# Patient Record
Sex: MALE | Race: WHITE | NOT HISPANIC OR LATINO | Employment: OTHER | URBAN - NONMETROPOLITAN AREA
[De-identification: names, ages, dates, MRNs, and addresses within clinical notes are randomized per-mention and may not be internally consistent; named-entity substitution may affect disease eponyms.]

---

## 2017-10-05 ENCOUNTER — APPOINTMENT (OUTPATIENT)
Dept: GENERAL RADIOLOGY | Facility: HOSPITAL | Age: 67
End: 2017-10-05

## 2017-10-05 ENCOUNTER — HOSPITAL ENCOUNTER (INPATIENT)
Facility: HOSPITAL | Age: 67
LOS: 1 days | Discharge: HOME | End: 2017-10-06
Attending: NEUROLOGICAL SURGERY | Admitting: NEUROLOGICAL SURGERY

## 2017-10-05 DIAGNOSIS — Z74.09 IMPAIRED MOBILITY AND ADLS: ICD-10-CM

## 2017-10-05 DIAGNOSIS — Z78.9 IMPAIRED MOBILITY AND ADLS: ICD-10-CM

## 2017-10-05 DIAGNOSIS — Z74.09 IMPAIRED FUNCTIONAL MOBILITY, BALANCE, AND ENDURANCE: ICD-10-CM

## 2017-10-05 PROBLEM — S06.5XAA SDH (SUBDURAL HEMATOMA) (HCC): Status: ACTIVE | Noted: 2017-10-05

## 2017-10-05 PROCEDURE — 25810000003 SODIUM CHLORIDE 0.9 % WITH KCL 20 MEQ 20-0.9 MEQ/L-% SOLUTION: Performed by: NEUROLOGICAL SURGERY

## 2017-10-05 PROCEDURE — 94799 UNLISTED PULMONARY SVC/PX: CPT

## 2017-10-05 PROCEDURE — 99223 1ST HOSP IP/OBS HIGH 75: CPT | Performed by: NEUROLOGICAL SURGERY

## 2017-10-05 RX ORDER — FAMOTIDINE 20 MG/1
20 TABLET, FILM COATED ORAL DAILY
Status: DISCONTINUED | OUTPATIENT
Start: 2017-10-06 | End: 2017-10-06 | Stop reason: HOSPADM

## 2017-10-05 RX ORDER — SODIUM CHLORIDE AND POTASSIUM CHLORIDE 150; 900 MG/100ML; MG/100ML
100 INJECTION, SOLUTION INTRAVENOUS CONTINUOUS
Status: DISCONTINUED | OUTPATIENT
Start: 2017-10-05 | End: 2017-10-06 | Stop reason: HOSPADM

## 2017-10-05 RX ORDER — SODIUM CHLORIDE 0.9 % (FLUSH) 0.9 %
1-10 SYRINGE (ML) INJECTION AS NEEDED
Status: DISCONTINUED | OUTPATIENT
Start: 2017-10-05 | End: 2017-10-06 | Stop reason: HOSPADM

## 2017-10-05 RX ORDER — ACETAMINOPHEN 650 MG/1
650 SUPPOSITORY RECTAL EVERY 4 HOURS PRN
Status: DISCONTINUED | OUTPATIENT
Start: 2017-10-05 | End: 2017-10-06 | Stop reason: HOSPADM

## 2017-10-05 RX ORDER — ACETAMINOPHEN 325 MG/1
650 TABLET ORAL EVERY 4 HOURS PRN
Status: DISCONTINUED | OUTPATIENT
Start: 2017-10-05 | End: 2017-10-06 | Stop reason: HOSPADM

## 2017-10-05 RX ORDER — ONDANSETRON 2 MG/ML
4 INJECTION INTRAMUSCULAR; INTRAVENOUS EVERY 6 HOURS PRN
Status: DISCONTINUED | OUTPATIENT
Start: 2017-10-05 | End: 2017-10-06 | Stop reason: HOSPADM

## 2017-10-05 RX ORDER — LEVOTHYROXINE SODIUM 0.05 MG/1
50 TABLET ORAL
Status: DISCONTINUED | OUTPATIENT
Start: 2017-10-06 | End: 2017-10-06 | Stop reason: HOSPADM

## 2017-10-05 RX ORDER — ONDANSETRON 4 MG/1
4 TABLET, ORALLY DISINTEGRATING ORAL EVERY 6 HOURS PRN
Status: DISCONTINUED | OUTPATIENT
Start: 2017-10-05 | End: 2017-10-06 | Stop reason: HOSPADM

## 2017-10-05 RX ORDER — SENNA AND DOCUSATE SODIUM 50; 8.6 MG/1; MG/1
2 TABLET, FILM COATED ORAL 2 TIMES DAILY
Status: DISCONTINUED | OUTPATIENT
Start: 2017-10-05 | End: 2017-10-06 | Stop reason: HOSPADM

## 2017-10-05 RX ORDER — TAMSULOSIN HYDROCHLORIDE 0.4 MG/1
0.4 CAPSULE ORAL DAILY
Status: DISCONTINUED | OUTPATIENT
Start: 2017-10-06 | End: 2017-10-06

## 2017-10-05 RX ORDER — ONDANSETRON 4 MG/1
4 TABLET, FILM COATED ORAL EVERY 6 HOURS PRN
Status: DISCONTINUED | OUTPATIENT
Start: 2017-10-05 | End: 2017-10-06 | Stop reason: HOSPADM

## 2017-10-05 RX ADMIN — POTASSIUM CHLORIDE AND SODIUM CHLORIDE 100 ML/HR: 900; 150 INJECTION, SOLUTION INTRAVENOUS at 22:49

## 2017-10-05 RX ADMIN — NICARDIPINE HYDROCHLORIDE 5 MG/HR: 25 INJECTION INTRAVENOUS at 22:39

## 2017-10-05 RX ADMIN — DOCUSATE SODIUM -SENNOSIDES 2 TABLET: 50; 8.6 TABLET, COATED ORAL at 22:36

## 2017-10-06 ENCOUNTER — APPOINTMENT (OUTPATIENT)
Dept: GENERAL RADIOLOGY | Facility: HOSPITAL | Age: 67
End: 2017-10-06

## 2017-10-06 VITALS
SYSTOLIC BLOOD PRESSURE: 129 MMHG | DIASTOLIC BLOOD PRESSURE: 66 MMHG | OXYGEN SATURATION: 97 % | WEIGHT: 213.6 LBS | BODY MASS INDEX: 33.53 KG/M2 | HEIGHT: 67 IN | HEART RATE: 70 BPM | TEMPERATURE: 97.8 F | RESPIRATION RATE: 16 BRPM

## 2017-10-06 LAB
ANION GAP SERPL CALCULATED.3IONS-SCNC: 10 MMOL/L (ref 4–13)
BUN BLD-MCNC: 17 MG/DL (ref 5–21)
BUN/CREAT SERPL: 20.2 (ref 7–25)
CALCIUM SPEC-SCNC: 8.8 MG/DL (ref 8.4–10.4)
CHLORIDE SERPL-SCNC: 103 MMOL/L (ref 98–110)
CO2 SERPL-SCNC: 29 MMOL/L (ref 24–31)
CREAT BLD-MCNC: 0.84 MG/DL (ref 0.5–1.4)
GFR SERPL CREATININE-BSD FRML MDRD: 91 ML/MIN/1.73
GLUCOSE BLD-MCNC: 153 MG/DL (ref 70–100)
POTASSIUM BLD-SCNC: 4.5 MMOL/L (ref 3.5–5.3)
SODIUM BLD-SCNC: 142 MMOL/L (ref 135–145)

## 2017-10-06 PROCEDURE — G8988 SELF CARE GOAL STATUS: HCPCS

## 2017-10-06 PROCEDURE — 94799 UNLISTED PULMONARY SVC/PX: CPT

## 2017-10-06 PROCEDURE — 99238 HOSP IP/OBS DSCHRG MGMT 30/<: CPT | Performed by: NEUROLOGICAL SURGERY

## 2017-10-06 PROCEDURE — 73590 X-RAY EXAM OF LOWER LEG: CPT

## 2017-10-06 PROCEDURE — G8981 BODY POS CURRENT STATUS: HCPCS | Performed by: PHYSICAL THERAPIST

## 2017-10-06 PROCEDURE — 97161 PT EVAL LOW COMPLEX 20 MIN: CPT

## 2017-10-06 PROCEDURE — 25810000003 SODIUM CHLORIDE 0.9 % WITH KCL 20 MEQ 20-0.9 MEQ/L-% SOLUTION: Performed by: NEUROLOGICAL SURGERY

## 2017-10-06 PROCEDURE — G8983 BODY POS D/C STATUS: HCPCS | Performed by: PHYSICAL THERAPIST

## 2017-10-06 PROCEDURE — 73030 X-RAY EXAM OF SHOULDER: CPT

## 2017-10-06 PROCEDURE — G8987 SELF CARE CURRENT STATUS: HCPCS

## 2017-10-06 PROCEDURE — G8982 BODY POS GOAL STATUS: HCPCS | Performed by: PHYSICAL THERAPIST

## 2017-10-06 PROCEDURE — 80048 BASIC METABOLIC PNL TOTAL CA: CPT | Performed by: NEUROLOGICAL SURGERY

## 2017-10-06 PROCEDURE — 97166 OT EVAL MOD COMPLEX 45 MIN: CPT

## 2017-10-06 RX ORDER — LEVETIRACETAM 250 MG/1
500 TABLET ORAL 2 TIMES DAILY
Qty: 8 TABLET | Refills: 0 | Status: SHIPPED | OUTPATIENT
Start: 2017-10-06 | End: 2017-10-10

## 2017-10-06 RX ORDER — LEVOTHYROXINE SODIUM 0.05 MG/1
50 TABLET ORAL DAILY
Qty: 30 TABLET | Refills: 0 | Status: SHIPPED | OUTPATIENT
Start: 2017-10-06 | End: 2017-10-21 | Stop reason: SDUPTHER

## 2017-10-06 RX ORDER — OXYCODONE HYDROCHLORIDE AND ACETAMINOPHEN 5; 325 MG/1; MG/1
1 TABLET ORAL EVERY 6 HOURS PRN
Qty: 15 TABLET | Refills: 0 | Status: SHIPPED | OUTPATIENT
Start: 2017-10-06 | End: 2017-10-24

## 2017-10-06 RX ORDER — TAMSULOSIN HYDROCHLORIDE 0.4 MG/1
0.4 CAPSULE ORAL NIGHTLY
Qty: 30 CAPSULE | Refills: 0 | Status: SHIPPED | OUTPATIENT
Start: 2017-10-06

## 2017-10-06 RX ORDER — TAMSULOSIN HYDROCHLORIDE 0.4 MG/1
0.4 CAPSULE ORAL NIGHTLY
Status: DISCONTINUED | OUTPATIENT
Start: 2017-10-06 | End: 2017-10-06 | Stop reason: HOSPADM

## 2017-10-06 RX ADMIN — LEVOTHYROXINE SODIUM 50 MCG: 50 TABLET ORAL at 06:30

## 2017-10-06 RX ADMIN — FAMOTIDINE 20 MG: 20 TABLET, FILM COATED ORAL at 08:14

## 2017-10-06 RX ADMIN — POTASSIUM CHLORIDE AND SODIUM CHLORIDE 100 ML/HR: 900; 150 INJECTION, SOLUTION INTRAVENOUS at 15:52

## 2017-10-06 NOTE — DISCHARGE INSTRUCTIONS
[facility] Neurosurgery    Postoperative care following brain surgery  Dear Patient,  You have recently undergone brain trauma (small subdural hematoma, traumatic subarachnoid hemorrhage, and subgaleal hematoma) and are now ready to go home. These written instructions are intended to help you to recover quickly.  • If you have ANY QUESTIONS about your condition prior to discharge please ask Dr. Gordillo. In particular, if you have concerns about going home discuss them now. We do not want you to go until you are completely comfortable leaving the hospital.   • If you have ANY QUESTIONS about your condition after you go home call your doctor. The number is [phone] which is answered 24 hours a day. During regular working hours a  will connect you to your doctor, one of his partners, or one of our nurses. At night or on weekends the answering service will connect you with the physicianon call. DO NOT HESITATE to call. We want to help you with any problems.     Seizures  Anyone who has brain injury has a small risk of seizures. Seizures may involve involuntary shaking of the arms and legs, loss of consciousness, loss of urinary or bowel control. They typically last a few minutes, then the patient returns to normal. Seizures are frightening to watch, but usually resolve without long-term problems. Your doctor will often attempt to prevent seizures after surgery by putting you on anti-seizure medicine like Dilantin or Keppra. Sometimes seizures occur even when these medicines are used  What to do if a seizure occurs:  • If a seizure occurs and the patient does not return to normal in 10 minutes, call your Dr. Gordillo or go to the local emergency room.   • If multiple seizures occur, call 911.     Neurological Deficit  Neurological deficits are problems with brain function like speech difficulty, weakness, numbness, imbalance, etc. These deficits may be present before or after brain surgery. Prior to  discharge Dr. Gordillo will make sure that all treatment needed to help you recover from such deficits has been instituted. He will also make sure that these deficits are stable or improving. After you go home, if you think any of your brain problems are getting worse, not better, it may be a sign of bleeding, infection, or other problems. Call Dr. Gordillo. He will order tests and prescribe treatment as needed.    Deep vein thrombosis/ pulmonary embolus  Some patients who undergo surgery develop blood clots in the veins of the legs. These clots can cause pain or swelling in the legs, or may cause no obvious problem. They can break free from the legs and travel to the lungs causing shortness of breath and/or chest pain. If you develop pain or swelling in your legs after surgery, call your doctor. If you develop breathing problems or chest pain after surgery, call 911.    Medication  It is important to take your medication EXACTLY as prescribed. Some patients are reluctant to take pain medication. It is perfectly fine to take pain medication for several weeks after surgery. We want to eliminate pain whenever possible. Many pain medications can cause nausea (sick to your stomach), constipation (inability to poop), or itching. Nausea may be minimized by taking the medication with food. Constipation can be relieved by taking stool softeners and/ or laxatives that you can purchase over the counter as needed. Some medications, like steroids or seizure medications, should not be stopped abruptly. They need to be weaned off over time. For instructions on weaning schedules call your doctor. Sometimes patients develop an allergy to a medication that was started during hospitalization. Most frequently an allergy will cause an itchy rash. Call your doctor if you think you might be having an allergic reaction.  •     Hydrocephalus  Your brain manufactures about one quart of clear fluid (called cerebrospinal fluid or CSF) every  day. Normally this fluid is reabsorbed into the blood stream. After brain surgery the flow of fluid and the reabsorption process may be impaired. This leads to a buildup of water on the brain that is called hydrocephalus. Hydrocephalus can cause headache, somnolence, difficulty thinking, imbalance and loss of urinary control. If you think that the patient may have hydrocephalus, call your doctor. She/He will order a brain scan. If it shows water buildup a simple operation called a shunt may be needed to fix the problem.    How to contact your doctor  The neurosurgeon, Dr. Gordillo, and her/his team did your surgery and, therefore, are likely to know more about your condition than any other physicians. We are immediately available to help you with any problems after surgery. Please call us for any concerns at the following numbers:   • Doctor’s office [phone] (answered 24 hours a day)   • [facility]'s  [phone] (alternative emergency number for on-call neurosurgeon)     Specific instructions related to your surgery  Diet: no restrictions, eat a heart healthy diet.   Activity: as tolerated, no heavy lifting or strenuous exercise for at least 2 weeks.  ?  Keppra (levetiracetam): Keppra is an antiepileptic drug, also called an anticonvulsant. It is given to you because you either had a seizure or your condition makes you prone to have seizures. Do not stop this drug suddenly. You will need to be tapered off this medication. Report any new mood or behavior problems to your physician. These include depression, anxiety, agitation, irritability, hyperactivity, or suicidal thoughts.   ?  Follow up:   You should call as soon as possible for follow up with Dr. Gordillo in the neurosurgery clinic ([phone]) in 3 weeks. Prior to your cochlear implant.

## 2017-10-06 NOTE — H&P
Primary Care Provider: Vasyl Kaplan MD  Requesting Provider: Tha Gordillo,*    Chief Complaint: Fall down Stairs      History of Present Illness  Chris Merida is a 67 y.o. male is here today as a transfer of care at the request of [facility]    Dago is a significant past medical history of 2 cerebrovascular accidents for which she is being treated for about.  He has had a prior MRI without evidence of vascular abnormality.  He is currently taking a 81 mg aspirin daily.    He was in his normal state of health until this afternoon when he was working at the top of the stairs in a barn.  There was a steer missing and he believes he tripped and fell through the stair.  He struck his left knee has right shoulder and the top of his head.  Next thing he recalls is being in the hospital.  Eyes any significant headache.  He denies facial pain.  He denies meningismus.  He denies blurry vision double vision or loss of vision.  He has no significant weakness.  He cannot recall is able to stand after the accident.  He denies any weakness or numbness.    He has a large scalp hematoma on the right parietal region and just inferior and medial to his left knee.    Review of Systems   Constitutional: Negative.    HENT: Negative.    Eyes: Negative.    Respiratory: Negative.    Cardiovascular: Negative.    Gastrointestinal: Negative.    Endocrine: Negative.    Genitourinary: Negative.    Musculoskeletal: Positive for arthralgias, joint swelling and myalgias.   Skin: Negative.    Allergic/Immunologic: Negative.    Neurological: Positive for light-headedness.   Hematological: Negative.    Psychiatric/Behavioral: Negative.        Past Medical History:   Diagnosis Date   • Hearing loss    • HTN (hypertension)    • Hypothyroid    • Renal stones    • Stroke        Past Surgical History:   Procedure Laterality Date   • CYSTOSCOPY BLADDER STONE LITHOTRIPSY     • LUMBAR FUSION         Family History: family history  is not on file.    Social History:  reports that he has never smoked. He has never used smokeless tobacco. He reports that he does not drink alcohol or use illicit drugs.    Medications:    Current Facility-Administered Medications:   •  acetaminophen (TYLENOL) tablet 650 mg, 650 mg, Oral, Q4H PRN **OR** acetaminophen (TYLENOL) suppository 650 mg, 650 mg, Rectal, Q4H PRN, Tha Gordillo MD  •  [START ON 10/6/2017] famotidine (PEPCID) tablet 20 mg, 20 mg, Oral, Daily, Tha Gordillo MD  •  [START ON 10/6/2017] levothyroxine (SYNTHROID, LEVOTHROID) tablet 50 mcg, 50 mcg, Oral, Q AM, Tha Gordillo MD  •  niCARdipine (CARDENE) 25 mg/250 mL (0.1 mg/mL) 0.9% NS infusion, 5-15 mg/hr, Intravenous, Titrated, Tha Gordillo MD  •  ondansetron (ZOFRAN) tablet 4 mg, 4 mg, Oral, Q6H PRN **OR** ondansetron ODT (ZOFRAN-ODT) disintegrating tablet 4 mg, 4 mg, Oral, Q6H PRN **OR** ondansetron (ZOFRAN) injection 4 mg, 4 mg, Intravenous, Q6H PRN, Tha Gordillo MD  •  sennosides-docusate sodium (SENOKOT-S) 8.6-50 MG tablet 2 tablet, 2 tablet, Oral, BID, Tha Gordillo MD  •  sodium chloride 0.9 % flush 1-10 mL, 1-10 mL, Intravenous, PRN, Tha Gordillo MD  •  sodium chloride 0.9 % with KCl 20 mEq/L infusion, 100 mL/hr, Intravenous, Continuous, Tha Gordillo MD  •  [START ON 10/6/2017] tamsulosin (FLOMAX) 24 hr capsule 0.4 mg, 0.4 mg, Oral, Daily, Tha Gordillo MD    Allergies:  Hydromorphone and Contrast dye    Objective   Physical Exam   Constitutional: He is oriented to person, place, and time. He appears well-developed and well-nourished.   HENT:   Head: Normocephalic and atraumatic.   Eyes: Conjunctivae and EOM are normal. Pupils are equal, round, and reactive to light.   Fundoscopic exam:       The right eye shows no exudate, no hemorrhage and no papilledema.        The left eye shows no exudate, no hemorrhage and no papilledema.   Neck: Normal range of  motion. Neck supple.   Cardiovascular:   Pulses:       Dorsalis pedis pulses are 2+ on the right side, and 2+ on the left side.        Posterior tibial pulses are 2+ on the right side, and 2+ on the left side.   Pulmonary/Chest: Effort normal. No respiratory distress.   Musculoskeletal:        Right shoulder: He exhibits tenderness.        Left knee: He exhibits swelling and ecchymosis.       Vascular Status -  His exam exhibits no right foot edema. His exam exhibits no left foot edema.  Neurological: He is oriented to person, place, and time. He has a normal Finger-Nose-Finger Test, a normal Heel to Márquez Test and a normal Tandem Gait Test.   Skin: Skin is warm. No rash noted. No erythema.   Psychiatric: His speech is normal.     Neurologic Exam     Mental Status   Oriented to person, place, and time.   Registration: recalls 3 of 3 objects. Recall at 5 minutes: recalls 2 of 3 objects.   Attention: normal. Concentration: normal.   Speech: speech is normal   Knowledge: consistent with education.        Speech is mildly slowed.  Patient is conversant and answers all questions appropriately.  Refers to family for some responses     Cranial Nerves     CN II   Visual acuity: normal    CN III, IV, VI   Pupils are equal, round, and reactive to light.  Extraocular motions are normal.   Diplopia: none    CN V   Facial sensation intact.   Right corneal reflex: normal  Left corneal reflex: normal    CN VII   Right facial weakness: none  Left facial weakness: none    CN VIII   Hearing: intact    CN IX, X   Palate: symmetric  Right gag reflex: normal  Left gag reflex: normal    CN XI   Right trapezius strength: normal  Left trapezius strength: normal    CN XII   Tongue deviation: none    Motor Exam   Right arm tone: normal  Left arm tone: normal  Right arm pronator drift: absent  Left arm pronator drift: absent  Right leg tone: normal  Left leg tone: normal    Strength   Strength 5/5 except as noted.     Sensory Exam   Light  touch normal.   Proprioception normal.     Gait, Coordination, and Reflexes     Gait  Gait: (Gait deferred secondary to fall precautions)    Coordination   Finger to nose coordination: normal  Heel to shin coordination: normal  Tandem walking coordination: normal    Reflexes   Reflexes 2+ except as noted.   Right plantar: normal  Left plantar: normal  Right Camacho: absent  Left Camacho: absent      Imaging: (independent review and interpretation)  Noncontrast CT of the head shows a very small Folstein subdural hematoma, a subcentimeter right thalamic intracerebral hematoma, and a small amount of traumatic blood in the sylvian fissure.  There is atherosclerosis of the intracranial vessels.  There is diffuse cortical atrophy.  No evidence of effacement or cortical compression in any area.    Outside labs:    INR of 0.9, sodium 142, potassium 3.5, creatinine 1.24, slightly elevated at 130, WBCs 9.5, hemoglobin 16, platelets 262    ASSESSMENT and PLAN  Chris Merida is a 67 y.o. male with a significant comorbidity hypertension and prior CVA. He presents with a new problem of fall downstairs. Physical exam findings of neurologically intact, hematoma and pain over left knee.  Their imaging shows a mild amount of traumatic subarachnoid hemorrhage and very small tentorial subdural hematoma.    Differential Diagnosis: Mild TBI, small amount of traumatic subarachnoid hemorrhage, small subdural hematoma, left knee pain with hematoma versus fracture, right shoulder pain    Recommendations:  Admit to ICU for strict blood pressure control.  Systolic blood pressures less than 140.  Cardene drip has been started.  We will begin Keppra 500 mg twice a day for seizure prophylaxis.  Suspicion for aneurysmal subarachnoid hemorrhage.  Patient with MRA 3 months ago that showed no evidence of aneurysm or vascular malformation.    X-ray of left knee and right shoulder.    Anticipate transfer to the floor tomorrow    BMP in morning for  sodium and creatinine check.    Tha Gordillo MD

## 2017-10-06 NOTE — PLAN OF CARE
Problem: Patient Care Overview (Adult)  Goal: Plan of Care Review  Outcome: Ongoing (interventions implemented as appropriate)    10/06/17 1351   Outcome Evaluation   Outcome Summary/Follow up Plan Pt seen in room with wife for initial OT evaluation. Pt fall 10/5/17 while making staircase in barn. Pt sustaind large scalp hematoma in parietal region, R shoulder swelling and pain, and L shin swelling. Pt Min A bed mobility, CGA no device sit to stand and then to step to chair. Pt I PLOF without device, pt demonstrates need for skilled OT to address deficits with ADL tasks,balance, ROM RUE, and deficits with Functional mobility s/p fall. Recommend d/ home with wife with possible outpatient PT/OT.   Coping/Psychosocial Response Interventions   Plan Of Care Reviewed With patient;spouse   Patient Care Overview   Progress improving         Problem: Inpatient Occupational Therapy  Goal: Range of Motion Goal LTG- OT  Outcome: Ongoing (interventions implemented as appropriate)    10/06/17 1351   Range of Motion OT LTG   Range of Motion Goal OT LTG, Date Established 10/06/17   Range of Motion Goal OT LTG, Time to Achieve by discharge   Range fo Motion Goal OT LTG, Joint shoulder abduction   Range of Motion Goal OT LTG, AROM Measure Pt ill demonstrate good AROm of RUE to 90 degrees shoulder flexion for edema management and joint motility   Range of Motion Goal OT LTG, AROM Fxnal Goal can progress past 90 when cleared by ortho       Goal: LB Dressing Goal LTG- OT  Outcome: Ongoing (interventions implemented as appropriate)    10/06/17 1351   LB Dressing OT LTG   LB Dressing Goal OT LTG, Date Established 10/06/17   LB Dressing Goal OT LTG, Time to Achieve by discharge   LB Dressing Goal OT LTG, [place] Level conditional independence       Goal: UB Dressing Goal LTG- OT  Outcome: Ongoing (interventions implemented as appropriate)    10/06/17 1351   UB Dressing OT LTG   UB Dressing Goal OT LTG, Date Established 10/06/17   UB  Dressing Goal OT LTG, Time to Achieve by discharge   UB Dressing Goal OT LTG, [place] Level conditional independence   UB Dressing Goal OT LTG, Additional Goal with use of adaptive techniques to don shirt

## 2017-10-06 NOTE — THERAPY EVALUATION
Acute Care - Occupational Therapy Initial Evaluation  [facility]     Patient Name: Chris Merida  : 1950  MRN: [id number]  Today's Date: 10/6/2017  Onset of Illness/Injury or Date of Surgery Date: 10/05/17  Date of Referral to OT: 10/05/17  Referring Physician: Dr. Gordillo    Admit Date: 10/5/2017       ICD-10-CM ICD-9-CM   1. Impaired mobility and ADLs Z74.09 799.89     Patient Active Problem List   Diagnosis   • SDH (subdural hematoma)     Past Medical History:   Diagnosis Date   • Hearing loss    • HTN (hypertension)    • Hypothyroid    • Renal stones    • Stroke      Past Surgical History:   Procedure Laterality Date   • CYSTOSCOPY BLADDER STONE LITHOTRIPSY     • LUMBAR FUSION            OT ASSESSMENT FLOWSHEET (last 72 hours)      OT Evaluation       10/06/17 1300 10/06/17 0004 10/06/17 0000          Rehab Evaluation    Document Type evaluation  -AC        Subjective Information no complaints;agree to therapy  -AC        Patient Effort, Rehab Treatment good  -AC        Symptoms Noted During/After Treatment none  -AC        General Information    Patient Profile Review yes  -AC        Onset of Illness/Injury or Date of Surgery Date 10/05/17  -AC        Referring Physician Dr. Gordillo  -        General Observations R eye black, L shin swollen, scratches to bilaterlal hands  -AC        Pertinent History Of Current Problem pt fell 10/5/17 s/p fall while making stairs in barn, pt landed prone.   -AC        Precautions/Limitations no known precautions/limitations;fall precautions  -AC        Prior Level of Function independent:;gait;transfer;ADL's;grooming;dressing;bathing;home management;cooking;cleaning;driving;shopping  -AC        Equipment Currently Used at Home   none  -HH      Living Environment    Lives With spouse  -AC spouse  -HH       Living Arrangements house  -AC house  -HH       Home Accessibility stairs to enter home  -AC no concerns  -       Number of Stairs to Enter Home 2  -AC         Stair Railings at Home none  -AC        Type of Financial/Environmental Concern none  -AC        Transportation Available none  -AC        Clinical Impression    Date of Referral to OT 10/05/17  -        OT Diagnosis Impaired ADLS  -AC        Prognosis good  -AC        Functional Level At Time Of Evaluation CGA for mobility and ADL tasks  -        Impairments Found (describe specific impairments) gait, locomotion, and balance  -        Patient/Family Goals Statement To go home  -        Criteria for Skilled Therapeutic Interventions Met yes;treatment indicated  -        Rehab Potential good, to achieve stated therapy goals  -        Therapy Frequency 3-5 times/wk  -        Predicted Duration of Therapy Intervention (days/wks) til d/c  -        Anticipated Discharge Disposition home  -        Functional Level Prior    Ambulation   0-->independent  -      Transferring   0-->independent  -      Toileting   0-->independent  -      Bathing   0-->independent  -      Dressing   0-->independent  -      Eating   0-->independent  -      Communication   0-->understands/communicates without difficulty  -      Swallowing   0-->swallows foods/liquids without difficulty  -      Pain Assessment    Pain Assessment No/denies pain  -        Vision Assessment/Intervention    Visual Impairment WFL  -        Cognitive Assessment/Intervention    Current Cognitive/Communication Assessment functional  -AC        Orientation Status oriented x 4  -        Follows Commands/Answers Questions 100% of the time;able to follow multi-step instructions  -        Personal Safety WNL/WFL  -AC        Personal Safety Interventions nonskid shoes/slippers when out of bed;supervised activity  -        ROM (Range of Motion)    General ROM no range of motion deficits identified;upper extremity range of motion deficits identified  -        General ROM Detail RUE impaired to 90 s/p fall; pain raising arm up and down   -        MMT (Manual Muscle Testing)    General MMT Assessment upper extremity strength deficits identified  -        General MMT Assessment Detail RUE deferred due to pain; LUE strength 5/5  -        Bed Mobility, Assessment/Treatment    Bed Mobility, Roll Right, [place] --  -        Bed Mob, Supine to Sit, [place] minimum assist (75% patient effort)  -        Bed Mob, Sit to Supine, [place] --  -        Transfer Assessment/Treatment    Transfers, Sit-Stand [place] contact guard assist  -        Transfers, Stand-Sit [place] contact guard assist  -        Toilet Transfer, [place] contact guard assist  -        Upper Body Bathing Assessment/Training    UB Bathing Assess/Train, [place] Level minimum assist (75% patient effort)  -        Lower Body Bathing Assessment/Training    LB Bathing Assess/Train, [place] Level minimum assist (75% patient effort)  -        Upper Body Dressing Assessment/Training    UB Dressing Assess/Train, [place] minimum assist (75% patient effort)  -        Lower Body Dressing Assessment/Training    LB Dressing Assess/Train, [place] minimum assist (75% patient effort)  -        Toileting Assessment/Training    Toileting Assess/Train, [place]ep[place] Level contact guard assist  -        Grooming Assessment/Training    Grooming Assess/Train, [place]ep[place] Level minimum assist (75% patient effort)  -        Edema Management    Edema Amount right:;moderate;left:   R shoulder swelling, L shin wollen with hematoma to tibia   -        General Therapy Interventions    Planned Therapy Interventions activity intolerance;ADL retraining;IADL retraining;balance training;energy conservation;home exercise program;ROM (Range of Motion);transfer training;edema management  -        Positioning and Restraints    Pre-Treatment Position in bed  -        Post Treatment Position chair  -        In Chair with family/caregiver;sitting   with  transporter on the way to floor  -          User Key  (r) = Recorded By, (t) = Taken By, (c) = Cosigned By    Initials Name Effective Dates     Brenda Bradford RN 08/02/16 -      BELINDA Tirado/AMRIK 09/07/16 -            Occupational Therapy Education     Title: PT OT SLP Therapies (Active)     Topic: Occupational Therapy (Active)     Point: ADL training (Active)    Description: Instruct learner(s) on proper safety adaptation and remediation techniques during self care or transfers.   Instruct in proper use of assistive devices.    Learning Progress Summary    Learner Readiness Method Response Comment Documented by Status   Patient Acceptance E NR eeduccated pt and pt wife on adaptive techcniques to put RUE in shirt first- will need reinforcement  10/06/17 1350 Active   Significant Other Acceptance E NR eeduccated pt and pt wife on adaptive techcniques to put RUE in shirt first- will need reinforcement  10/06/17 1350 Active               Point: Home exercise program (Done)    Description: Instruct learner(s) on appropriate technique for monitoring, assisting and/or progressing therapeutic exercises/activities.    Learning Progress Summary    Learner Readiness Method Response Comment Documented by Status   Patient Acceptance E VU pt could bnefit from HEP ROM ex for RUE impairments  10/06/17 1350 Done   Significant Other Acceptance E VU pt could bnefit from HEP ROM ex for RUE impairments  10/06/17 1350 Done                      User Key     Initials Effective Dates Name Provider Type Discipline     09/07/16 -  Krista Corbett, OTR/L Occupational Therapist OT                  OT Recommendation and Plan  Anticipated Discharge Disposition: home  Planned Therapy Interventions: activity intolerance, ADL retraining, IADL retraining, balance training, energy conservation, home exercise program, ROM (Range of Motion), transfer training, edema management  Therapy Frequency: 3-5 times/wk  Plan of Care  Review  Plan Of Care Reviewed With: patient, spouse  Progress: improving  Outcome Summary/Follow up Plan: Pt seen in room with wife for initial OT evaluation.  Pt fall 10/5/17 while making staircase in barn.  Pt sustaind  large scalp hematoma in parietal region, R shoulder swelling and pain, and L shin swelling.  Pt Min A bed mobility, CGA no device sit to stand and then to step to chair.  Pt I PLOF without device, pt demonstrates need for skilled OT to address deficits with ADL tasks,balance, ROM RUE, and deficits with Functional mobility s/p fall.  Recommend d/ home with wife with possible outpatient PT/OT.          OT Goals       10/06/17 1351          Range of Motion OT LTG    Range of Motion Goal OT LTG, Date Established 10/06/17  -AC      Range of Motion Goal OT LTG, Time to Achieve by discharge  -AC      Range fo Motion Goal OT LTG, Joint shoulder abduction  -AC      Range of Motion Goal OT LTG, AROM Measure Pt ill demonstrate good AROm of RUE to 90 degrees shoulder flexion for edema management and  joint motility  -AC      Range of Motion Goal OT LTG, AROM Fxnal Goal can progress past 90 when cleared by ortho  -AC      LB Dressing OT LTG    LB Dressing Goal OT LTG, Date Established 10/06/17  -AC      LB Dressing Goal OT LTG, Time to Achieve by discharge  -AC      LB Dressing Goal OT LTG, [place] Level conditional independence  -AC      UB Dressing OT LTG    UB Dressing Goal OT LTG, Date Established 10/06/17  -AC      UB Dressing Goal OT LTG, Time to Achieve by discharge  -AC      UB Dressing Goal OT LTG, [place] Level conditional independence  -AC      UB Dressing Goal OT LTG, Additional Goal with use of adaptive techniques to don shirt  -AC        User Key  (r) = Recorded By, (t) = Taken By, (c) = Cosigned By    Initials Name Provider Type    LINN Corbett, OTR/L Occupational Therapist                Outcome Measures       10/06/17 1300          How much help from another is currently  needed...    Putting on and taking off regular lower body clothing? 3  -AC      Bathing (including washing, rinsing, and drying) 3  -AC      Toileting (which includes using toilet bed pan or urinal) 3  -AC      Putting on and taking off regular upper body clothing 3  -AC      Taking care of personal grooming (such as brushing teeth) 3  -AC      Eating meals 4  -AC      Score 19  -AC      Functional Assessment    Outcome Measure Options AM-PAC 6 Clicks Daily Activity (OT)  -AC        User Key  (r) = Recorded By, (t) = Taken By, (c) = Cosigned By    Initials Name Provider Type    AC Krista Corbett OTR/L Occupational Therapist          Time Calculation:   OT Start Time: 1300  OT Stop Time: 1355  OT Time Calculation (min): 55 min    Therapy Charges for Today     Code Description Service Date Service Provider Modifiers Qty    [id number]  OT SELFCARE CURRENT 10/6/2017 Krista Corbett OTR/L GO, CK 1    [id number]  OT SELFCARE PROJECTED 10/6/2017 Krista Corbett OTR/L GO, CI 1    [id number]  OT EVAL MOD COMPLEXITY 4 10/6/2017 Krista Corbett OTR/L GO 1          OT G-codes  OT Professional Judgement Used?: Yes  OT Functional Scales Options: AM-PAC 6 Clicks Daily Activity (OT)  Score: 19  Functional Limitation: Self care  Self Care Current Status (): At least 40 percent but less than 60 percent impaired, limited or restricted  Self Care Goal Status (): At least 1 percent but less than 20 percent impaired, limited or restricted    BELINDA Orta/AMRIK  10/6/2017

## 2017-10-06 NOTE — THERAPY DISCHARGE NOTE
Acute Care - Physical Therapy Initial Eval/Discharge  [facility]     Patient Name: Chris Merida  : 1950  MRN: [id number]  Today's Date: 10/6/2017   Onset of Illness/Injury or Date of Surgery Date: 10/05/17  Date of Referral to PT: 10/05/17  Referring Physician: Dr. Gordillo      Admit Date: 10/5/2017    Visit Dx:    ICD-10-CM ICD-9-CM   1. Impaired mobility and ADLs Z74.09 799.89   2. Impaired functional mobility, balance, and endurance Z74.09 V49.89     Patient Active Problem List   Diagnosis   • SDH (subdural hematoma)     Past Medical History:   Diagnosis Date   • Hearing loss    • HTN (hypertension)    • Hypothyroid    • Renal stones    • Stroke      Past Surgical History:   Procedure Laterality Date   • CYSTOSCOPY BLADDER STONE LITHOTRIPSY     • LUMBAR FUSION            PT ASSESSMENT (last 72 hours)      PT Evaluation       10/06/17 1408 10/06/17 1300    Rehab Evaluation    Document Type evaluation   See MAR  -MS (r) RM (t) MS (c) evaluation  -AC    Subjective Information agree to therapy;complains of;pain  -MS (r) RM (t) MS (c) no complaints;agree to therapy  -AC    Patient Effort, Rehab Treatment  good  -AC    Symptoms Noted During/After Treatment  none  -AC    Symptoms Noted Comment eval perform at 1435  -MS (r) RM (t) MS (c)     General Information    Patient Profile Review yes  -MS (r) RM (t) MS (c) yes  -AC    Onset of Illness/Injury or Date of Surgery Date 10/05/17  -MS (r) RM (t) MS (c) 10/05/17  -AC    Referring Physician Dr. Gordillo  -MS (r) RM (t) MS (c) Dr. Gordillo  -    General Observations Pt fowlers, swollen right eye and swelling below left knee. Family at bedside  -MS (r) RM (t) MS (c) R eye black, L shin swollen, scratches to bilaterlal hands  -AC    Pertinent History Of Current Problem Pt fell 10/5 hitting his head, L knee and R shoulder. X-rays are negative for acute osseous injury to shoulder and knee. Possible remote ACL injury.   -MS (r) RM (t) MS (c) pt fell 10/5/17 s/p  fall while making stairs in barn, pt landed prone.   -AC    Precautions/Limitations fall precautions  -MS (r) RM (t) MS (c) no known precautions/limitations;fall precautions  -AC    Prior Level of Function independent:;all household mobility;community mobility;ADL's  -MS (r) RM (t) MS (c) independent:;gait;transfer;ADL's;grooming;dressing;bathing;home management;cooking;cleaning;driving;shopping  -AC    Equipment Currently Used at Home none  -CE none  -AC    Plans/Goals Discussed With patient and family;agreed upon  -MS (r) RM (t) MS (c) patient and family  -AC    Risks Reviewed patient and family:;LOB;nausea/vomiting;dizziness;increased discomfort;change in vital signs  -MS (r) RM (t) MS (c) patient and family:  -AC    Benefits Reviewed patient and family:;improve function;increase independence;increase strength;increase balance;decrease pain  -MS (r) RM (t) MS (c) patient:;spouse/S.O.:;improve function;increase independence;increase strength;increase balance  -AC    Barriers to Rehab none identified  -MS (r) RM (t) MS (c) none identified  -AC    Living Environment    Lives With spouse  -CE spouse  -AC    Living Arrangements house  -CE house  -AC    Home Accessibility stairs to enter home  -MS (r) RM (t) MS (c) stairs to enter home  -AC    Number of Stairs to Enter Home 2  -MS (r) RM (t) MS (c) 2  -AC    Stair Railings at Home none  -MS (r) RM (t) MS (c) none  -AC    Type of Financial/Environmental Concern none  -MS (r) RM (t) MS (c) none  -AC    Transportation Available car;family or friend will provide  -CE none  -AC    Clinical Impression    Date of Referral to PT 10/05/17  -MS (r) RM (t) MS (c)     Patient/Family Goals Statement return home independently  -MS (r) RM (t) MS (c)     Criteria for Skilled Therapeutic Interventions Met no;no problems identified which require skilled intervention  -MS (r) RM (t) MS (c)     Predicted Duration of Therapy Intervention (days/wks) until discharge  -MS (r) RM (t) MS (c)      Pain Assessment    Pain Assessment Sawant-May FACES  -MS (r) RM (t) MS (c) No/denies pain  -AC    Sawant-May FACES Pain Rating 6   states he can't put number to it but its high  -MS (r) RM (t) MS (c)     Pain Type Acute pain  -MS (r) RM (t) MS (c)     Pain Location Shoulder  -MS (r) RM (t) MS (c)     Pain Orientation Right  -MS (r) RM (t) MS (c)     Pain Descriptors Stabbing  -MS (r) RM (t) MS (c)     Pain Frequency Constant/continuous  -MS (r) RM (t) MS (c)     Pain Intervention(s) Ambulation/increased activity  -MS (r) RM (t) MS (c)     Response to Interventions tolerated  -MS (r) RM (t) MS (c)     Vision Assessment/Intervention    Visual Impairment WFL  -MS (r) RM (t) MS (c) WFL  -AC    Cognitive Assessment/Intervention    Current Cognitive/Communication Assessment functional  -MS (r) RM (t) MS (c) functional  -AC    Orientation Status oriented x 4  -MS (r) RM (t) MS (c) oriented x 4  -AC    Follows Commands/Answers Questions 100% of the time;able to follow multi-step instructions  -MS (r) RM (t) MS (c) 100% of the time;able to follow multi-step instructions  -AC    Personal Safety WNL/WFL  -MS (r) RM (t) MS (c) WNL/WFL  -AC    Personal Safety Interventions fall prevention program maintained;gait belt;muscle strengthening facilitated;nonskid shoes/slippers when out of bed;supervised activity  -MS (r) RM (t) MS (c) nonskid shoes/slippers when out of bed;supervised activity  -AC    ROM (Range of Motion)    General ROM  no range of motion deficits identified;upper extremity range of motion deficits identified  -AC    General ROM Detail BLE WNL; LUE WFL, Right shoulder limited to 25% AROM and 50% PROM in abduction. 65% PROM in flexion. Painful arc and drop arm perfrom but test inclinclusive due to pain occuring with any active movement in that shoulder.    -MS (r) RM (t) MS (c) RUE impaired to 90 s/p fall; pain raising arm up and down  -AC    MMT (Manual Muscle Testing)    General MMT Assessment  upper extremity  strength deficits identified  -AC    General MMT Assessment Detail LUE 5/5, BLE 5/5, Pt 4+/5 with IR/ER in RUE  -MS (r) RM (t) MS (c) RUE deferred due to pain; LUE strength 5/5  -AC    Bed Mobility, Assessment/Treatment    Bed Mobility, Roll Right, [place]  --  -AC    Bed Mob, Supine to Sit, [place] minimum assist (75% patient effort)  -MS (r) RM (t) MS (c) minimum assist (75% patient effort)  -AC    Bed Mob, Sit to Supine, [place]  --  -AC    Bed Mobility, Impairments pain  -MS (r) RM (t) MS (c)     Bed Mobility, Comment Pt is hesitant to use right shoulder for mobility due to pain   -MS (r) RM (t) MS (c)     Transfer Assessment/Treatment    Transfers, Sit-Stand [place] independent  -MS (r) RM (t) MS (c) contact guard assist  -AC    Transfers, Stand-Sit [place] independent  -MS (r) RM (t) MS (c) contact guard assist  -AC    Toilet Transfer, [place]  contact guard assist  -AC    Gait Assessment/Treatment    Gait, [place] Level independent  -MS (r) RM (t) MS (c)     Gait, Distance (Feet) 250  -MS (r) RM (t) MS (c)     Gait, Gait Deviations left:;antalgic  -MS (r) RM (t) MS (c)     Gait, Impairments pain  -MS (r) RM (t) MS (c)     Gait, Comment Pt states LLE is slightly pain where swelling is below knee  -MS (r) RM (t) MS (c)     Motor Skills/Interventions    Additional Documentation Balance Skills Training (Group)  -MS (r) RM (t) MS (c)     Balance Skills Training    Sitting-Level of Assistance Independent  -MS (r) RM (t) MS (c)     Sitting-Balance Support Right upper extremity supported;Left upper extremity supported;Feet supported  -MS (r) RM (t) MS (c)     Standing-Level of Assistance Independent  -MS (r) RM (t) MS (c)     Static Standing Balance Support No upper extremity supported  -MS (r) RM (t) MS (c)     Gait Balance-Level of Assistance Independent  -MS (r) RM (t) MS (c)     Gait Balance Support No upper extremity supported  -MS (r) RM (t) MS (c)     Sensory  Assessment/Intervention    Sensory Impairment --   none identified   -MS (r) RM (t) MS (c)     Edema Management    Edema Amount left:;minimal   on tibia just below knee  -MS (r) RM (t) MS (c) right:;moderate;left:   R shoulder swelling, L shin wollen with hematoma to tibia   -AC    Positioning and Restraints    Pre-Treatment Position in bed  -MS (r) RM (t) MS (c) in bed  -AC    Post Treatment Position bed  -MS (r) RM (t) MS (c) chair  -AC    In Bed sitting EOB;call light within reach;encouraged to call for assist;with family/caregiver;side rails up x2  -MS (r) RM (t) MS (c)     In Chair  with family/caregiver;sitting   with transporter on the way to floor  -AC      10/06/17 0004 10/06/17 0000    General Information    Equipment Currently Used at Home  none  -HH    Living Environment    Lives With spouse  -HH     Living Arrangements house  -     Home Accessibility no concerns  -HH       User Key  (r) = Recorded By, (t) = Taken By, (c) = Cosigned By    Initials Name Provider Type    MS TurciosRoberta DARIEL Lynn, PT DPT Physical Therapist     Brenda Bradford, RN Registered Nurse    JOEL Mcqueen, BSW      Krista Corbett, OTR/L Occupational Therapist     Zain Lazcano, PT Student PT Student          Physical Therapy Education     Title: PT OT SLP Therapies (Active)     Topic: Physical Therapy (Active)     Point: Body mechanics (Done)    Learning Progress Summary    Learner Readiness Method Response Comment Documented by Status   Patient Acceptance E VU Pt educated on not hiking shoulder with movement in order to avoid upper trap pain and stiffness. Pt also educated on need to follow up with ortho if shoulder pain persist.  10/06/17 1535 Done               Point: Precautions (Done)    Learning Progress Summary    Learner Readiness Method Response Comment Documented by Status   Patient Acceptance E VU Pt educated on not hiking shoulder with movement in order to avoid upper trap pain and stiffness. Pt  also educated on need to follow up with ortho if shoulder pain persist.  10/06/17 1535 Done                      User Key     Initials Effective Dates Name Provider Type Discipline     08/21/17 -  Zain Lazcano, PT Student PT Student PT                PT Recommendation and Plan  Anticipated Discharge Disposition: home, home with assist  PT Frequency: evaluation only  Plan of Care Review  Plan Of Care Reviewed With: patient  Progress: progress toward functional goals as expected  Outcome Summary/Follow up Plan: Pt agreed to and completed physical therapy evaluation. Pt required min A to move from supine to sit due to fear of using RUE because of pain. Pt was independent with sit to stand and to ambulate 250 feet. Pt had negative infraspinatus test and incluclisive findings on other test for possible rotator cuff tears due to pain with all active movements at shoulder. Pt advised to follow up with orthopedics if shoulder pain with movement persist. No safety concerns with mobility at this time. Anticipated discharge to home with assistance.               Outcome Measures       10/06/17 1500 10/06/17 1300       How much help from another person do you currently need...    Turning from your back to your side while in flat bed without using bedrails? 3  -MS (r) RM (t) MS (c)      Moving from lying on back to sitting on the side of a flat bed without bedrails? 3  -MS (r) RM (t) MS (c)      Moving to and from a bed to a chair (including a wheelchair)? 4  -MS (r) RM (t) MS (c)      Standing up from a chair using your arms (e.g., wheelchair, bedside chair)? 4  -MS (r) RM (t) MS (c)      Climbing 3-5 steps with a railing? 4  -MS (r) RM (t) MS (c)      To walk in hospital room? 4  -MS (r) RM (t) MS (c)      AM-PAC 6 Clicks Score 22  -MS (r) RM (t)      How much help from another is currently needed...    Putting on and taking off regular lower body clothing?  3  -AC     Bathing (including washing, rinsing, and drying)  3   -AC     Toileting (which includes using toilet bed pan or urinal)  3  -AC     Putting on and taking off regular upper body clothing  3  -AC     Taking care of personal grooming (such as brushing teeth)  3  -AC     Eating meals  4  -AC     Score  19  -AC     Functional Assessment    Outcome Measure Options AM-PAC 6 Clicks Basic Mobility (PT)  -MS (r) RM (t) MS (c) AM-PAC 6 Clicks Daily Activity (OT)  -AC       User Key  (r) = Recorded By, (t) = Taken By, (c) = Cosigned By    Initials Name Provider Type    MS Roberta Lynn, PT DPT Physical Therapist    AC Krista Corbett, OTR/L Occupational Therapist     Zain Lazcano, PT Student PT Student           Time Calculation:         PT Charges       10/06/17 1529          Time Calculation    Start Time 1425  -MS (r) RM (t) MS (c)      Stop Time 1513  -MS (r) RM (t) MS (c)      Time Calculation (min) 48 min  -MS (r) RM (t)      PT Received On 10/06/17  -MS (r) RM (t) MS (c)        User Key  (r) = Recorded By, (t) = Taken By, (c) = Cosigned By    Initials Name Provider Type    MS Roberta Lynn, PT DPT Physical Therapist     Zain Lazcano, PT Student PT Student          Therapy Charges for Today     Code Description Service Date Service Provider Modifiers Qty    [id number]  PT EVAL LOW COMPLEXITY 3 10/6/2017 Zain Lazcano, PT Student GP 1          PT G-Codes  PT Professional Judgement Used?: Yes  Outcome Measure Options: AM-PAC 6 Clicks Basic Mobility (PT)  Score: 22  Functional Limitation: Changing and maintaining body position  Changing and Maintaining Body Position Current Status (): At least 20 percent but less than 40 percent impaired, limited or restricted  Changing and Maintaining Body Position Goal Status (): At least 20 percent but less than 40 percent impaired, limited or restricted  Changing and Maintaining Body Position Discharge Status (): At least 20 percent but less than 40 percent impaired, limited or restricted    PT Discharge  Summary  Anticipated Discharge Disposition: home, home with assist  Reason for Discharge: Independent  Outcomes Achieved: Refer to plan of care for updates on goals achieved  Discharge Destination: Home, Home with assist    Zain Lazcano, PT Student  10/6/2017

## 2017-10-06 NOTE — PLAN OF CARE
Problem: Patient Care Overview (Adult)  Goal: Plan of Care Review  Outcome: Ongoing (interventions implemented as appropriate)    10/06/17 5034   Outcome Evaluation   Outcome Summary/Follow up Plan Patient has no significant deficits related to his fall and remained the same through shift. VSS through shift. PRN cardene drip did not have to be used at all. Patient slept well through the night.       Goal: Adult Individualization and Mutuality  Outcome: Ongoing (interventions implemented as appropriate)  Goal: Discharge Needs Assessment  Outcome: Ongoing (interventions implemented as appropriate)    Problem: Fall Risk (Adult)  Goal: Identify Related Risk Factors and Signs and Symptoms  Outcome: Ongoing (interventions implemented as appropriate)  Goal: Absence of Falls  Outcome: Ongoing (interventions implemented as appropriate)    Problem: Pain, Acute (Adult)  Goal: Identify Related Risk Factors and Signs and Symptoms  Outcome: Ongoing (interventions implemented as appropriate)  Goal: Acceptable Pain Control/Comfort Level  Outcome: Ongoing (interventions implemented as appropriate)    Problem: Stroke (Hemorrhagic) (Adult)  Goal: Signs and Symptoms of Listed Potential Problems Will be Absent or Manageable (Stroke)  Outcome: Ongoing (interventions implemented as appropriate)

## 2017-10-06 NOTE — PROGRESS NOTES
Neurosurgery Daily Progress Note    Assessment: `  Chris Merida is a 67 y.o. yo with a significant PMH of prior stroke who presented with fall down stairs.  Imaging revealed a very small subdural hematoma and large subgaleal subdural hematoma small amount of traumatic subarachnoid hemorrhage.      DDX:  Small tentorial subdural hematoma, traumatic subarachnoid hemorrhage, subgaleal hematoma      Plan:   Neuro: Neurologically intact with mild confusion.  No evidence of drift.  CV: No acute issues  Pulm: Acute issues  :  No acute issues.  Remove Wren  FEN: Tolerating by mouth  ID: No acute issues  Heme:  DVT prophylaxis  Pain: Left tibial pain and right shoulder pain.  Managed with oral pain medication  Msk: Negative x-ray of right shoulder and left leg.  Dispo: Anticipate DC today, pending PT/OT eval      Chief complaint:   Fall from stairs    Subjective  Confusion resolving    Temp:  [97.8 °F (36.6 °C)-98.7 °F (37.1 °C)] 97.8 °F (36.6 °C)  Heart Rate:  [66-82] 66  Resp:  [15-20] 15  BP: (115-139)/(63-79) 129/70    Objective    Neurologic Exam     Mental Status   Oriented to person, place, and time.   Speech: speech is normal   Level of consciousness: alert    Cranial Nerves     CN III, IV, VI   Pupils are equal, round, and reactive to light.  Extraocular motions are normal.     CN V   Facial sensation intact.     CN VII   Facial expression full, symmetric.     Motor Exam   Right arm pronator drift: absent  Left arm pronator drift: absent    Strength   Right deltoid: 5/5  Left deltoid: 5/5  Right biceps: 5/5  Left biceps: 5/5  Right triceps: 5/5  Left triceps: 5/5  Right iliopsoas: 5/5  Left iliopsoas: 5/5  Right quadriceps: 5/5  Left quadriceps: 5/5  Right gastroc: 5/5  Left gastroc: 5/5    Sensory Exam   Light touch normal.       Active Problems:    SDH (subdural hematoma)      Lab Results (last 24 hours)     Procedure Component Value Units Date/Time    Basic Metabolic Panel [[id number]]  (Abnormal) Collected:   10/06/17 0232    Specimen:  Blood Updated:  10/06/17 0326     Glucose 153 (H) mg/dL      BUN 17 mg/dL       Specimen hemolyzed. Results may be affected.        Creatinine 0.84 mg/dL      Sodium 142 mmol/L      Potassium 4.5 mmol/L       Specimen hemolyzed.  Results may be affected.        Chloride 103 mmol/L      CO2 29.0 mmol/L      Calcium 8.8 mg/dL      eGFR Non African Amer 91 mL/min/1.73      BUN/Creatinine Ratio 20.2     Anion Gap 10.0 mmol/L     Narrative:       GFR Normal >60  Chronic Kidney Disease <60  Kidney Failure <15          Drains:             [id number]  Tha Gordillo MD

## 2017-10-06 NOTE — PLAN OF CARE
Problem: Patient Care Overview (Adult)  Goal: Plan of Care Review    10/06/17 1536   Outcome Evaluation   Outcome Summary/Follow up Plan Pt agreed to and completed physical therapy evaluation. Pt required min A to move from supine to sit due to fear of using RUE because of pain. Pt was independent with sit to stand and to ambulate 250 feet. Pt had negative infraspinatus test and incluclisive findings on other test for possible rotator cuff tears due to pain with all active movements at shoulder. Pt advised to follow up with orthopedics if shoulder pain with movement persist. No safety concerns with mobility at this time. Anticipated discharge to home with assistance.    Coping/Psychosocial Response Interventions   Plan Of Care Reviewed With patient   Patient Care Overview   Progress progress toward functional goals as expected

## 2017-10-06 NOTE — PROGRESS NOTES
Discharge Planning Assessment   [place]     Patient Name: Chris Merida  MRN: [id number]  Today's Date: 10/6/2017    Admit Date: 10/5/2017          Discharge Needs Assessment       10/06/17 1408    Living Environment    Lives With spouse    Living Arrangements house    Transportation Available car;family or friend will provide    Living Environment    Provides Primary Care For no one    Quality Of Family Relationships supportive    Able to Return to Prior Living Arrangements yes    Discharge Needs Assessment    Concerns To Be Addressed no discharge needs identified    Readmission Within The Last 30 Days no previous admission in last 30 days    Anticipated Changes Related to Illness none    Equipment Currently Used at Home none    Equipment Needed After Discharge none    Discharge Planning Comments Patient was independent prior to admission.  Patient resides with spouse.  Patient has PCP and RX coverage.  Patient will return home at discharge with no anticipated needs identified at this time.            Discharge Plan     None        Discharge Placement     No information found        Expected Discharge Date and Time     Expected Discharge Date Expected Discharge Time    Oct 6, 2017               Demographic Summary     None            Functional Status     None            Psychosocial     None            Abuse/Neglect     None            Legal     None            Substance Abuse     None            Patient Forms     None          TIMUR Johnson

## 2017-10-07 NOTE — THERAPY DISCHARGE NOTE
Acute Care - Occupational Therapy Discharge Summary  [facility]     Patient Name: Chris Merida  : 1950  MRN: [id number]    Today's Date: 10/7/2017  Onset of Illness/Injury or Date of Surgery Date: 10/05/17    Date of Referral to OT: 10/05/17  Referring Physician: Dr. Gordillo      Admit Date: 10/5/2017        OT Recommendation and Plan    Visit Dx:    ICD-10-CM ICD-9-CM   1. Impaired mobility and ADLs Z74.09 799.89   2. Impaired functional mobility, balance, and endurance Z74.09 V49.89                     OT Goals       10/07/17 0718 10/06/17 1351       Range of Motion OT LTG    Range of Motion Goal OT LTG, Date Established  10/06/17  -AC     Range of Motion Goal OT LTG, Time to Achieve  by discharge  -AC     Range fo Motion Goal OT LTG, Joint  shoulder abduction  -AC     Range of Motion Goal OT LTG, AROM Measure  Pt ill demonstrate good AROm of RUE to 90 degrees shoulder flexion for edema management and  joint motility  -AC     Range of Motion Goal OT LTG, AROM Fxnal Goal  can progress past 90 when cleared by ortho  -AC     Range of Motion Goal OT LTG, Date Goal Reviewed 10/07/17  -TS      Range of Motion Goal OT LTG, Outcome goal not met  -TS      Reason Goal Not Met (ROM) OT LTG discharged from facility  -TS      LB Dressing OT LTG    LB Dressing Goal OT LTG, Date Established  10/06/17  -AC     LB Dressing Goal OT LTG, Time to Achieve  by discharge  -AC     LB Dressing Goal OT LTG, [place] Level  conditional independence  -AC     LB Dressing Goal OT LTG, Date Goal Reviewed 10/07/17  -TS      LB Dressing Goal OT LTG, Outcome goal not met  -TS      LB Dressing Goal OT LTG, Reason Goal Not Met discharged from facility  -TS      UB Dressing OT LTG    UB Dressing Goal OT LTG, Date Established  10/06/17  -AC     UB Dressing Goal OT LTG, Time to Achieve  by discharge  -AC     UB Dressing Goal OT LTG, [place] Level  conditional independence  -AC     UB Dressing Goal OT LTG, Additional Goal  with use  of adaptive techniques to don shirt  -AC     UB Dressing Goal OT LTG, Date Goal Reviewed 10/07/17  -TS      UB Dressing Goal OT LTG, Outcome goal not met  -TS      UB Dressing Goal OT LTG, Reason Goal Not Met discharged from facility  -TS        User Key  (r) = Recorded By, (t) = Taken By, (c) = Cosigned By    Initials Name Provider Type     CORRIE Kaiser/L Occupational Therapy Assistant    AC Krista Corbett, OTR/L Occupational Therapist                Outcome Measures       10/06/17 1500 10/06/17 1300       How much help from another person do you currently need...    Turning from your back to your side while in flat bed without using bedrails? 3  -MS (r) RM (t) MS (c)      Moving from lying on back to sitting on the side of a flat bed without bedrails? 3  -MS (r) RM (t) MS (c)      Moving to and from a bed to a chair (including a wheelchair)? 4  -MS (r) RM (t) MS (c)      Standing up from a chair using your arms (e.g., wheelchair, bedside chair)? 4  -MS (r) RM (t) MS (c)      Climbing 3-5 steps with a railing? 4  -MS (r) RM (t) MS (c)      To walk in hospital room? 4  -MS (r) RM (t) MS (c)      AM-PAC 6 Clicks Score 22  -MS (r) RM (t)      How much help from another is currently needed...    Putting on and taking off regular lower body clothing?  3  -AC     Bathing (including washing, rinsing, and drying)  3  -AC     Toileting (which includes using toilet bed pan or urinal)  3  -AC     Putting on and taking off regular upper body clothing  3  -AC     Taking care of personal grooming (such as brushing teeth)  3  -AC     Eating meals  4  -AC     Score  19  -AC     Functional Assessment    Outcome Measure Options AM-PAC 6 Clicks Basic Mobility (PT)  -MS (r) RM (t) MS (c) AM-PAC 6 Clicks Daily Activity (OT)  -AC       User Key  (r) = Recorded By, (t) = Taken By, (c) = Cosigned By    Initials Name Provider Type    MS Roberta Lynn, PT DPT Physical Therapist    AC Krista A Chelle, OTR/L Occupational  Therapist    RM Zain Lazcano, PT Student PT Student              OT Discharge Summary  Reason for Discharge: Discharge from facility  Outcomes Achieved: Refer to plan of care for updates on goals achieved  Discharge Destination: Home with assist      EMILY Grover  10/7/2017

## 2017-10-07 NOTE — PLAN OF CARE
Problem: Inpatient Occupational Therapy  Goal: Range of Motion Goal LTG- OT  Outcome: Unable to achieve outcome(s) by discharge Date Met:  10/07/17    10/06/17 1351 10/07/17 0718   Range of Motion OT LTG   Range of Motion Goal OT LTG, Date Established 10/06/17 --    Range of Motion Goal OT LTG, Time to Achieve by discharge --    Range fo Motion Goal OT LTG, Joint shoulder abduction --    Range of Motion Goal OT LTG, AROM Measure Pt ill demonstrate good AROm of RUE to 90 degrees shoulder flexion for edema management and joint motility --    Range of Motion Goal OT LTG, AROM Fxnal Goal can progress past 90 when cleared by ortho --    Range of Motion Goal OT LTG, Date Goal Reviewed --  10/07/17   Range of Motion Goal OT LTG, Outcome --  goal not met   Reason Goal Not Met (ROM) OT LTG --  discharged from facility       Goal: LB Dressing Goal LTG- OT  Outcome: Unable to achieve outcome(s) by discharge Date Met:  10/07/17    10/06/17 1351 10/07/17 0718   LB Dressing OT LTG   LB Dressing Goal OT LTG, Date Established 10/06/17 --    LB Dressing Goal OT LTG, Time to Achieve by discharge --    LB Dressing Goal OT LTG, [place] Level conditional independence --    LB Dressing Goal OT LTG, Date Goal Reviewed --  10/07/17   LB Dressing Goal OT LTG, Outcome --  goal not met   LB Dressing Goal OT LTG, Reason Goal Not Met --  discharged from facility       Goal: UB Dressing Goal LTG- OT  Outcome: Unable to achieve outcome(s) by discharge Date Met:  10/07/17    10/06/17 1351 10/07/17 0718   UB Dressing OT LTG   UB Dressing Goal OT LTG, Date Established 10/06/17 --    UB Dressing Goal OT LTG, Time to Achieve by discharge --    UB Dressing Goal OT LTG, [place] Level conditional independence --    UB Dressing Goal OT LTG, Additional Goal with use of adaptive techniques to don shirt --    UB Dressing Goal OT LTG, Date Goal Reviewed --  10/07/17   UB Dressing Goal OT LTG, Outcome --  goal not met   UB Dressing Goal OT LTG,  Reason Goal Not Met --  discharged from facility

## 2017-10-07 NOTE — PLAN OF CARE
Problem: Patient Care Overview (Adult)  Goal: Plan of Care Review  Outcome: Outcome(s) achieved Date Met:  10/06/17    10/06/17 1947   Outcome Evaluation   Outcome Summary/Follow up Plan VSS, safety maintained, up x1 to br, no c/o pain, no neuro changes   Coping/Psychosocial Response Interventions   Plan Of Care Reviewed With patient   Patient Care Overview   Progress improving       Goal: Adult Individualization and Mutuality  Outcome: Outcome(s) achieved Date Met:  10/06/17  Goal: Discharge Needs Assessment  Outcome: Outcome(s) achieved Date Met:  10/06/17    Problem: Fall Risk (Adult)  Goal: Identify Related Risk Factors and Signs and Symptoms  Outcome: Outcome(s) achieved Date Met:  10/06/17  Goal: Absence of Falls  Outcome: Outcome(s) achieved Date Met:  10/06/17    Problem: Pain, Acute (Adult)  Goal: Identify Related Risk Factors and Signs and Symptoms  Outcome: Outcome(s) achieved Date Met:  10/06/17  Goal: Acceptable Pain Control/Comfort Level  Outcome: Outcome(s) achieved Date Met:  10/06/17    Problem: Stroke (Hemorrhagic) (Adult)  Goal: Signs and Symptoms of Listed Potential Problems Will be Absent or Manageable (Stroke)  Outcome: Outcome(s) achieved Date Met:  10/06/17

## 2017-10-09 RX ORDER — LEVETIRACETAM 250 MG/1
TABLET ORAL
Qty: 16 TABLET | Refills: 0 | OUTPATIENT
Start: 2017-10-09

## 2017-10-09 RX ORDER — LEVETIRACETAM 250 MG/1
TABLET ORAL
Qty: 360 TABLET | Refills: 0 | OUTPATIENT
Start: 2017-10-09

## 2017-10-09 NOTE — TELEPHONE ENCOUNTER
Patient does not require prescription for Keppra.  He only needed it for 4 days for seizure prophylaxis.  He is outside of the window of concern.

## 2017-10-09 NOTE — TELEPHONE ENCOUNTER
Patient does not have return appointment with our office, would you like one? I can send Rx in if you wish to approve.  Thanks.

## 2017-10-09 NOTE — DISCHARGE SUMMARY
Date of Discharge:  10/9/2017    Discharge Diagnosis: Traumatic subarachnoid hemorrhage, small tentorial subdural hematoma, small intraparenchymal hematoma, fall from stairs    Presenting Problem/History of Present Illness  SDH (subdural hematoma) [I62.00]     Hospital Course  Patient is a 67 y.o. male presented with fall from stairs.  Patient was in his normal state of health until he fell from stairs which was working on in his barn.  He felt that he missed a step and then tumbled).  He was taken to an outside hospital where a CT scan of his head revealed a small tentorial subdural hematoma, traumatic subarachnoid hemorrhage in the sylvian fissure, and subgaleal hematoma.  The outside scan was read as concern for aneurysmal rupture however the patient had an MRA performed less than 3 months ago which showed no aneurysm.  Therefore repeat imaging was deemed unnecessary.    The patient quickly returned to his neurological baseline which included minor confusion secondary to early onset dementia.    After observation in the ICU for 12 hours he was transferred for further physical therapy and occupational therapy approved him for home without further    Procedures Performed         Consults:   Consults     No orders found from 9/6/2017 to 10/6/2017.          Pertinent Test Results:   X-ray of the tibial and fibula on the left lowe    X-ray of the right shoulder show    Condition on Discharge:  stable    Vital Signs   stable    Physical Exam:   Physical Exam   Constitutional: He is oriented to person, place, and time.   Eyes: EOM are normal. Pupils are equal, round, and reactive to light.   Neurological: He is oriented to person, place, and time.   Psychiatric: His speech is normal.        Neurologic Exam     Mental Status   Oriented to person, place, and time.   Speech: speech is normal   Level of consciousness: alert    Cranial Nerves     CN III, IV, VI   Pupils are equal, round, and reactive to light.  Extraocular  motions are normal.     CN V   Facial sensation intact.     CN VII   Facial expression full, symmetric.     Motor Exam   Right arm pronator drift: absent  Left arm pronator drift: absent    Strength   Right deltoid: 5/5  Left deltoid: 5/5  Right biceps: 5/5  Left biceps: 5/5  Right triceps: 5/5  Left triceps: 5/5  Right iliopsoas: 5/5  Left iliopsoas: 5/5  Right quadriceps: 5/5  Left quadriceps: 5/5  Right gastroc: 5/5  Left gastroc: 5/5    Sensory Exam   Light touch normal.       Discharge Disposition  Home or Self Care    Discharge Medications   Chris Merida   Home Medication Instructions LASHANDA:[id number]    Printed on:10/09/17 2419   Medication Information                      levETIRAcetam (KEPPRA) 250 MG tablet  Take 2 tablets by mouth 2 (Two) Times a Day for 4 days.             levothyroxine (SYNTHROID, LEVOTHROID) 50 MCG tablet  Take 1 tablet by mouth Daily.             oxyCODONE-acetaminophen (PERCOCET) 5-325 MG per tablet  Take 1 tablet by mouth Every 6 (Six) Hours As Needed for Severe Pain .             tamsulosin (FLOMAX) 0.4 MG capsule 24 hr capsule  Take 1 capsule by mouth Every Night.                 Discharge Diet:   As tolerated     Activity at Discharge:   As tolerated     Follow-up Appointments  Future Appointments  Date Time Provider Department Center   11/1/2017 10:45 AM Tha Gordillo MD [facility] None         Test Results Pending at Discharge  None        Tha Gordillo MD  10/09/17  12:55 PM    Time: Discharge 25 min

## 2017-10-21 RX ORDER — LEVOTHYROXINE SODIUM 0.05 MG/1
TABLET ORAL
Qty: 90 TABLET | Refills: 0 | Status: SHIPPED | OUTPATIENT
Start: 2017-10-21

## 2017-10-24 ENCOUNTER — OFFICE VISIT (OUTPATIENT)
Dept: NEUROSURGERY | Facility: CLINIC | Age: 67
End: 2017-10-24

## 2017-10-24 VITALS — DIASTOLIC BLOOD PRESSURE: 82 MMHG | WEIGHT: 201 LBS | BODY MASS INDEX: 31.48 KG/M2 | SYSTOLIC BLOOD PRESSURE: 140 MMHG

## 2017-10-24 DIAGNOSIS — S06.5X0A TRAUMATIC SUBDURAL HEMORRHAGE WITHOUT LOSS OF CONSCIOUSNESS, INITIAL ENCOUNTER (HCC): Primary | ICD-10-CM

## 2017-10-24 DIAGNOSIS — Z78.9 TOBACCO NON-USER: ICD-10-CM

## 2017-10-24 PROCEDURE — 99213 OFFICE O/P EST LOW 20 MIN: CPT | Performed by: NEUROLOGICAL SURGERY

## 2017-10-24 NOTE — PROGRESS NOTES
Chief complaint:   Chief Complaint   Patient presents with   • Follow-up     Suffered traumatic subdural hemorrhage after falling down stairs on 10/05/17. Patient reports minimal headaches and difficulty walking, wife states that he has been sleeping a lot more than normal.        Subjective     HPI: Patient is a 67 y.o. male presented with fall from stairs.  Patient was in his normal state of health until he fell from stairs which was working on in his barn.  He felt that he missed a step and then tumbled).  He was taken to an outside hospital where a CT scan of his head revealed a small tentorial subdural hematoma, traumatic subarachnoid hemorrhage in the sylvian fissure, and subgaleal hematoma.  The outside scan was read as concern for aneurysmal rupture however the patient had an MRA performed less than 3 months ago which showed no aneurysm.  Therefore repeat imaging was deemed unnecessary.     The patient quickly returned to his neurological baseline which included minor confusion secondary to early onset dementia.     After observation in the ICU for 12 hours he was transferred for further physical therapy and occupational therapy approved him for home without further    Interval History: Patient is doing well with minimal headaches.  He is returned to his normal activity.  He has a hematoma on his left leg with pain that is 7 out of 10.  He is able to ambulate without difficulty and is being seen by an orthopedic surgeon for this.    Review of Systems   Constitutional: Negative.    HENT: Negative.    Eyes: Negative.    Respiratory: Negative.    Cardiovascular: Negative.    Gastrointestinal: Negative.    Endocrine: Negative.    Genitourinary: Negative.    Musculoskeletal: Positive for joint swelling and myalgias.   Skin: Negative.    Allergic/Immunologic: Negative.    Neurological: Negative.    Hematological: Negative.    Psychiatric/Behavioral: Negative.        PFSH:  Past Medical History:   Diagnosis Date    • HTN (hypertension)    • Hypothyroid    • Renal stones    • Stroke        Past Surgical History:   Procedure Laterality Date   • CYSTOSCOPY BLADDER STONE LITHOTRIPSY     • LUMBAR FUSION         Objective      Current Outpatient Prescriptions   Medication Sig Dispense Refill   • levothyroxine (SYNTHROID, LEVOTHROID) 50 MCG tablet TAKE 1 TABLET BY MOUTH DAILY 90 tablet 0   • tamsulosin (FLOMAX) 0.4 MG capsule 24 hr capsule Take 1 capsule by mouth Every Night. 30 capsule 0     No current facility-administered medications for this visit.        Vital Signs  /82 (BP Location: Right arm, Patient Position: Sitting)  Wt 201 lb (91.2 kg)  BMI 31.48 kg/m2  Physical Exam   Constitutional: He is oriented to person, place, and time.   Eyes: EOM are normal. Pupils are equal, round, and reactive to light.   Neurological: He is oriented to person, place, and time. He has normal strength. Gait normal.   Psychiatric: His speech is normal.     Neurologic Exam     Mental Status   Oriented to person, place, and time.   Speech: speech is normal        Speech is mildly slowed.  But this is baseline per the wife.     Cranial Nerves     CN II   Visual fields full to confrontation.     CN III, IV, VI   Pupils are equal, round, and reactive to light.  Extraocular motions are normal.     CN V   Right facial sensation deficit: none  Left facial sensation deficit: none    CN VII   Facial expression full, symmetric.     CN VIII   Hearing: intact    CN IX, X   Palate: symmetric    CN XI   Right sternocleidomastoid strength: normal  Left sternocleidomastoid strength: normal    CN XII   Tongue deviation: none    Motor Exam     Strength   Strength 5/5 throughout.     Sensory Exam   Right arm light touch: normal  Left arm light touch: normal    Gait, Coordination, and Reflexes     Gait  Gait: normal    Reflexes   Reflexes 2+ except as noted.     (12 bullet pts)    Results Review: None      Assessment/Plan: small tentorial subdural hematoma,  traumatic subarachnoid hemorrhage     Patient is recovering well.  No further intervention needed.  No further imaging needed.  He is not on any antiepileptic drugs.  Subgaleal hematoma has resolved and he is cleared for surgery for cochlear implant.  Follow-up when necessary.    I discussed the patients findings and my recommendations with patient    Level of Risk: Low due to:  one stable chronic illnesss  MDM: Low Complexity  (Low = 21990, Mod = 14533)    Tha Gordillo MD

## 2017-11-22 RX ORDER — LEVOTHYROXINE SODIUM 0.05 MG/1
TABLET ORAL
Qty: 30 TABLET | Refills: 0 | OUTPATIENT
Start: 2017-11-22

## 2018-08-10 RX ORDER — METOPROLOL SUCCINATE 25 MG/1
25 TABLET, EXTENDED RELEASE ORAL DAILY
COMMUNITY

## 2018-08-10 RX ORDER — OLOPATADINE HYDROCHLORIDE 665 UG/1
SPRAY NASAL 2 TIMES DAILY
COMMUNITY
End: 2018-08-15 | Stop reason: ALTCHOICE

## 2018-08-10 RX ORDER — FLUTICASONE PROPIONATE 50 MCG
2 SPRAY, SUSPENSION (ML) NASAL DAILY
COMMUNITY

## 2018-08-15 ENCOUNTER — OFFICE VISIT (OUTPATIENT)
Dept: CARDIOLOGY | Facility: CLINIC | Age: 68
End: 2018-08-15

## 2018-08-15 VITALS
DIASTOLIC BLOOD PRESSURE: 64 MMHG | OXYGEN SATURATION: 99 % | HEIGHT: 68 IN | BODY MASS INDEX: 28.64 KG/M2 | WEIGHT: 189 LBS | HEART RATE: 76 BPM | SYSTOLIC BLOOD PRESSURE: 112 MMHG

## 2018-08-15 DIAGNOSIS — I50.9 CONGESTIVE HEART FAILURE, UNSPECIFIED CONGESTIVE HEART FAILURE CHRONICITY, UNSPECIFIED CONGESTIVE HEART FAILURE TYPE: Primary | ICD-10-CM

## 2018-08-15 DIAGNOSIS — I38 VALVULAR HEART DISEASE: ICD-10-CM

## 2018-08-15 PROBLEM — E03.9 HYPOTHYROID: Status: ACTIVE | Noted: 2018-08-15

## 2018-08-15 PROCEDURE — 93000 ELECTROCARDIOGRAM COMPLETE: CPT | Performed by: INTERNAL MEDICINE

## 2018-08-15 PROCEDURE — 99203 OFFICE O/P NEW LOW 30 MIN: CPT | Performed by: INTERNAL MEDICINE

## 2018-08-15 RX ORDER — ASPIRIN 81 MG/1
81 TABLET ORAL DAILY
COMMUNITY

## 2018-08-15 NOTE — PROGRESS NOTES
Reason for Visit: cardiovascular follow up.    HPI:  Chris Merida is a 68 y.o. male is being seen for consultation today at the request of Dr. Kaplan.  He recently had his appendix and part of his colon taken out on 6/29/18. Patient reportedly had a test done at [place] that demonstrated mitral regurgitation and aortic stenosis with an ejection fraction of 45%.  Presumably this was an echocardiogram.  Unfortunately no reports or imaging is available for me to review.  He reports that the valvular heart disease as diagnosed based off her murmur.  He is not sure what if any heart testing was done.  He has an appointment with a heart specialist next Thursday.  They are not sure if it is a cardiologist or a cardiothoracic surgeon.  They are adamant that any testing and procedures that are done be done at [facility].  They want to have a local cardiologist as well.  He denies any shortness of breath, edema, PND, orthopnea.  He gets occasional sharp chest pain.    Previous Cardiac Testing and Procedures:  - Echo (June 2018) at [facility]     Patient Active Problem List   Diagnosis   • Traumatic subdural hemorrhage without loss of consciousness (CMS/HCC)   • Tobacco non-user   • Hypothyroid       Social History   Substance Use Topics   • Smoking status: Never Smoker   • Smokeless tobacco: Never Used   • Alcohol use No       Family History   Problem Relation Age of Onset   • Heart attack Mother 96   • Heart attack Father 70       The following portions of the patient's history were reviewed and updated as appropriate: allergies, current medications, past family history, past medical history, past social history, past surgical history and problem list.      Current Outpatient Prescriptions:   •  aspirin 81 MG EC tablet, Take 81 mg by mouth Daily., Disp: , Rfl:   •  docusate sodium (COLACE) 50 MG capsule, Take  by mouth 2 (Two) Times a Day., Disp: , Rfl:   •  fluticasone (FLONASE) 50 MCG/ACT nasal spray, 2 sprays into  "the nostril(s) as directed by provider Daily., Disp: , Rfl:   •  levothyroxine (SYNTHROID, LEVOTHROID) 50 MCG tablet, TAKE 1 TABLET BY MOUTH DAILY, Disp: 90 tablet, Rfl: 0  •  metoprolol succinate XL (TOPROL-XL) 25 MG 24 hr tablet, Take 25 mg by mouth Daily., Disp: , Rfl:   •  tamsulosin (FLOMAX) 0.4 MG capsule 24 hr capsule, Take 1 capsule by mouth Every Night., Disp: 30 capsule, Rfl: 0    Review of Systems   Constitution: Negative for chills, fever and weight loss.   HENT: Negative for sore throat.    Eyes: Negative for blurred vision and visual disturbance.   Cardiovascular: Negative for chest pain, dyspnea on exertion, leg swelling, palpitations, paroxysmal nocturnal dyspnea and syncope.   Respiratory: Negative for cough and shortness of breath.    Endocrine: Negative for cold intolerance and polyuria.   Skin: Negative for itching and rash.   Musculoskeletal: Negative for joint swelling and myalgias.   Gastrointestinal: Negative for abdominal pain, diarrhea and vomiting.   Genitourinary: Negative for dysuria and hematuria.   Neurological: Negative for dizziness, headaches and numbness.   Psychiatric/Behavioral: Negative for depression. The patient is not nervous/anxious.    Allergic/Immunologic: Negative for hives.       Objective   /64 (BP Location: Left arm, Patient Position: Sitting, Cuff Size: Adult)   Pulse 76   Ht 172.7 cm (68\")   Wt 85.7 kg (189 lb)   SpO2 99%   BMI 28.74 kg/m²   Physical Exam   Constitutional: He is oriented to person, place, and time. He appears well-developed and well-nourished.   HENT:   Head: Normocephalic and atraumatic.   Eyes: Pupils are equal, round, and reactive to light. Conjunctivae and EOM are normal.   Neck: Normal range of motion. Neck supple. No JVD present. No thyromegaly present.   Cardiovascular: Normal rate and regular rhythm.    Murmur (grade III/VI systolic murmur at left sternal border heard accross the precordium) heard.  Pulmonary/Chest: Effort normal " and breath sounds normal. He has no wheezes. He has no rales.   Abdominal: Soft. Bowel sounds are normal. He exhibits no distension. There is no tenderness.   Musculoskeletal: Normal range of motion. He exhibits no edema.   Neurological: He is alert and oriented to person, place, and time. Coordination normal.   Skin: Skin is warm and dry. No rash noted.   Psychiatric: He has a normal mood and affect. His behavior is normal.       ECG 12 Lead  Date/Time: 8/15/2018 10:49 AM  Performed by: BRIAN PATEL  Authorized by: BRIAN PATEL   Comparison: compared with previous ECG from 6/27/2018  Comparison to previous ECG: Criteria for LVH is no longer present  Rhythm: sinus rhythm  Rate: normal  Clinical impression: normal ECG              ICD-10-CM ICD-9-CM   1. Congestive heart failure, unspecified congestive heart failure chronicity, unspecified congestive heart failure type (CMS/[facility]) I50.9 428.0   2. Valvular heart disease I38 424.90         Assessment/Plan:  1.  Congestive heart failure: Patient reports having an test done at [place] that demonstrated reduced ejection fraction.  Unfortunately no images or reports are available today in order for me to assess this any further.  We will attempt to obtain copy of these records from [facility].    2.  Valvular heart disease: Patient reportedly had a test done at [place] that demonstrated mitral regurgitation and aortic stenosis.  Unfortunately no reports or imaging is available today.  We will attempt to obtain copy from [facility] in order to be able to address this further.    Patient has a follow-up appointment with a cardiovascular specialist at [facility] next week.  They are unsure if it is a cardiothoracic surgeon or a cardiologist.  They want all testing, procedures, and surgery to be done at [facility].  They will call to set up a follow-up appointment after further workup is complete at [facility].